# Patient Record
Sex: MALE | Race: BLACK OR AFRICAN AMERICAN | URBAN - METROPOLITAN AREA
[De-identification: names, ages, dates, MRNs, and addresses within clinical notes are randomized per-mention and may not be internally consistent; named-entity substitution may affect disease eponyms.]

---

## 2021-02-11 ENCOUNTER — HOSPITAL ENCOUNTER (OUTPATIENT)
Dept: MEDSURG UNIT | Facility: HOSPITAL | Age: 46
End: 2021-02-12
Attending: INTERNAL MEDICINE | Admitting: INTERNAL MEDICINE

## 2021-02-11 LAB
ABS NEUT (OLG): 4.24 X10(3)/MCL (ref 2.1–9.2)
ALBUMIN SERPL-MCNC: 3.8 GM/DL (ref 3.5–5)
ALBUMIN/GLOB SERPL: 1 RATIO (ref 1.1–2)
ALP SERPL-CCNC: 141 UNIT/L (ref 40–150)
ALT SERPL-CCNC: 31 UNIT/L (ref 0–55)
AMPHET UR QL SCN: NEGATIVE
APAP SERPL-MCNC: <17.4 UG/ML (ref 17.4–30)
APPEARANCE, UA: CLEAR
AST SERPL-CCNC: 31 UNIT/L (ref 5–34)
BACTERIA #/AREA URNS AUTO: ABNORMAL /HPF
BARBITURATE SCN PRESENT UR: NEGATIVE
BASOPHILS # BLD AUTO: 0 X10(3)/MCL (ref 0–0.2)
BASOPHILS NFR BLD AUTO: 0 %
BENZODIAZ UR QL SCN: NEGATIVE
BILIRUB SERPL-MCNC: 0.7 MG/DL
BILIRUB UR QL STRIP: NEGATIVE
BILIRUBIN DIRECT+TOT PNL SERPL-MCNC: 0.3 MG/DL (ref 0–0.5)
BILIRUBIN DIRECT+TOT PNL SERPL-MCNC: 0.4 MG/DL (ref 0–0.8)
BUN SERPL-MCNC: 16 MG/DL (ref 8.9–20.6)
CALCIUM SERPL-MCNC: 9.1 MG/DL (ref 8.4–10.2)
CANNABINOIDS UR QL SCN: POSITIVE
CHLORIDE SERPL-SCNC: 104 MMOL/L (ref 98–107)
CK MB SERPL-MCNC: 3.7 NG/ML
CK MB SERPL-MCNC: 4.2 NG/ML
CK SERPL-CCNC: 211 U/L (ref 30–200)
CK SERPL-CCNC: 246 U/L (ref 30–200)
CO2 SERPL-SCNC: 27 MMOL/L (ref 22–29)
COCAINE UR QL SCN: POSITIVE
COLOR UR: YELLOW
CREAT SERPL-MCNC: 1.42 MG/DL (ref 0.73–1.18)
EOSINOPHIL # BLD AUTO: 0 X10(3)/MCL (ref 0–0.9)
EOSINOPHIL NFR BLD AUTO: 0 %
ERYTHROCYTE [DISTWIDTH] IN BLOOD BY AUTOMATED COUNT: 14.1 % (ref 11.5–14.5)
ETHANOL SERPL-MCNC: <10 MG/DL
GLOBULIN SER-MCNC: 3.7 GM/DL (ref 2.4–3.5)
GLUCOSE (UA): NEGATIVE
GLUCOSE SERPL-MCNC: 65 MG/DL (ref 74–100)
HCT VFR BLD AUTO: 44.9 % (ref 40–51)
HGB BLD-MCNC: 14.5 GM/DL (ref 13.5–17.5)
HGB UR QL STRIP: NEGATIVE
HYALINE CASTS #/AREA URNS LPF: ABNORMAL /LPF
IMM GRANULOCYTES # BLD AUTO: 0.02 10*3/UL
IMM GRANULOCYTES NFR BLD AUTO: 0 %
KETONES UR QL STRIP: NEGATIVE
LEUKOCYTE ESTERASE UR QL STRIP: NEGATIVE
LYMPHOCYTES # BLD AUTO: 0.9 X10(3)/MCL (ref 0.6–4.6)
LYMPHOCYTES NFR BLD AUTO: 16 %
MCH RBC QN AUTO: 29.1 PG (ref 26–34)
MCHC RBC AUTO-ENTMCNC: 32.3 GM/DL (ref 31–37)
MCV RBC AUTO: 90 FL (ref 80–100)
MONOCYTES # BLD AUTO: 0.6 X10(3)/MCL (ref 0.1–1.3)
MONOCYTES NFR BLD AUTO: 10 %
NEUTROPHILS # BLD AUTO: 4.24 X10(3)/MCL (ref 2.1–9.2)
NEUTROPHILS NFR BLD AUTO: 73 %
NITRITE UR QL STRIP: NEGATIVE
OPIATES UR QL SCN: NEGATIVE
PCP UR QL: NEGATIVE
PH UR STRIP.AUTO: 6.5 [PH] (ref 3–11)
PH UR STRIP: 6.5 [PH] (ref 4.5–8)
PLATELET # BLD AUTO: 190 X10(3)/MCL (ref 130–400)
PMV BLD AUTO: 11.1 FL (ref 7.4–10.4)
POTASSIUM SERPL-SCNC: 4.7 MMOL/L (ref 3.5–5.1)
PROT SERPL-MCNC: 7.5 GM/DL (ref 6.4–8.3)
PROT UR QL STRIP: 50 MG/DL
RBC # BLD AUTO: 4.99 X10(6)/MCL (ref 4.5–5.9)
RBC #/AREA URNS AUTO: ABNORMAL /HPF
SALICYLATES SERPL-MCNC: <5 MG/DL
SARS-COV-2 AG RESP QL IA.RAPID: NEGATIVE
SODIUM SERPL-SCNC: 139 MMOL/L (ref 136–145)
SP GR UR STRIP: 1.01 (ref 1–1.03)
SQUAMOUS #/AREA URNS LPF: ABNORMAL /LPF
T4 FREE SERPL-MCNC: 0.93 NG/DL (ref 0.7–1.48)
TROPONIN I SERPL-MCNC: 0.13 NG/ML (ref 0–0.04)
TROPONIN I SERPL-MCNC: 0.17 NG/ML (ref 0–0.04)
TROPONIN I SERPL-MCNC: 0.21 NG/ML (ref 0–0.04)
TSH SERPL-ACNC: 0.27 UIU/ML (ref 0.35–4.94)
UROBILINOGEN UR STRIP-ACNC: NORMAL
WBC # SPEC AUTO: 5.8 X10(3)/MCL (ref 4.5–11)
WBC #/AREA URNS AUTO: ABNORMAL /HPF

## 2021-02-12 LAB
ABS NEUT (OLG): 0.94 X10(3)/MCL (ref 2.1–9.2)
ALBUMIN SERPL-MCNC: 3.2 GM/DL (ref 3.5–5)
ALBUMIN/GLOB SERPL: 1 RATIO (ref 1.1–2)
ALP SERPL-CCNC: 118 UNIT/L (ref 40–150)
ALT SERPL-CCNC: 26 UNIT/L (ref 0–55)
ANISOCYTOSIS BLD QL SMEAR: ABNORMAL
AST SERPL-CCNC: 25 UNIT/L (ref 5–34)
BASOPHILS NFR BLD MANUAL: 1 %
BILIRUB SERPL-MCNC: 0.5 MG/DL
BILIRUBIN DIRECT+TOT PNL SERPL-MCNC: 0.2 MG/DL (ref 0–0.5)
BILIRUBIN DIRECT+TOT PNL SERPL-MCNC: 0.3 MG/DL (ref 0–0.8)
BUN SERPL-MCNC: 19 MG/DL (ref 8.9–20.6)
CALCIUM SERPL-MCNC: 8.9 MG/DL (ref 8.4–10.2)
CHLORIDE SERPL-SCNC: 106 MMOL/L (ref 98–107)
CO2 SERPL-SCNC: 22 MMOL/L (ref 22–29)
CREAT SERPL-MCNC: 1.17 MG/DL (ref 0.73–1.18)
EOSINOPHIL NFR BLD MANUAL: 2 %
ERYTHROCYTE [DISTWIDTH] IN BLOOD BY AUTOMATED COUNT: 13.8 % (ref 11.5–14.5)
EST CREAT CLEARANCE SER (OHS): 79.47 ML/MIN
GLOBULIN SER-MCNC: 3.3 GM/DL (ref 2.4–3.5)
GLUCOSE SERPL-MCNC: 92 MG/DL (ref 74–100)
GRANULOCYTES NFR BLD MANUAL: 19 % (ref 43–75)
HAV IGM SERPL QL IA: NONREACTIVE
HBV CORE IGM SERPL QL IA: NONREACTIVE
HBV SURFACE AG SERPL QL IA: NONREACTIVE
HCT VFR BLD AUTO: 42.6 % (ref 40–51)
HCV AB SERPL QL IA: NONREACTIVE
HGB BLD-MCNC: 13.9 GM/DL (ref 13.5–17.5)
HIV 1+2 AB+HIV1 P24 AG SERPL QL IA: REACTIVE
LYMPHOCYTES NFR BLD MANUAL: 60 % (ref 20.5–51.1)
MCH RBC QN AUTO: 29.3 PG (ref 26–34)
MCHC RBC AUTO-ENTMCNC: 32.6 GM/DL (ref 31–37)
MCV RBC AUTO: 89.7 FL (ref 80–100)
MONOCYTES NFR BLD MANUAL: 18 % (ref 2–9)
PLATELET # BLD AUTO: 182 X10(3)/MCL (ref 130–400)
PLATELET # BLD EST: ADEQUATE 10*3/UL
PMV BLD AUTO: 11.9 FL (ref 7.4–10.4)
POTASSIUM SERPL-SCNC: 3.7 MMOL/L (ref 3.5–5.1)
PROT SERPL-MCNC: 6.5 GM/DL (ref 6.4–8.3)
RBC # BLD AUTO: 4.75 X10(6)/MCL (ref 4.5–5.9)
RBC MORPH BLD: ABNORMAL
RPR SER QL: REACTIVE
SODIUM SERPL-SCNC: 138 MMOL/L (ref 136–145)
T PALLIDUM AB SER QL: REACTIVE
TROPONIN I SERPL-MCNC: 0.19 NG/ML (ref 0–0.04)
WBC # SPEC AUTO: 3.2 X10(3)/MCL (ref 4.5–11)

## 2021-02-14 LAB — FINAL CULTURE: NO GROWTH

## 2021-02-17 LAB
FINAL CULTURE: NORMAL
FINAL CULTURE: NORMAL

## 2022-04-30 NOTE — ED PROVIDER NOTES
"   Patient:   Ignacio Manzanares             MRN: [id number]            FIN: [id number]               Age:   45 years     Sex:  Male     :  1975   Associated Diagnoses:   Suicidal ideations; History of seizures; Atypical chest pain; Elevated troponin   Author:   Damian Zarate MD      Basic Information   Time seen: Date & time 2021 13:07:00.   History source: Patient.   Arrival mode: Ambulance.   History limitation: None.   Additional information: Chief Complaint from Nursing Triage Note : Chief Complaint   2021 12:59 CST      Chief Complaint           PT WEARING MASK IN /AASI W CO CP AND BREAKTHRU SEIZURES X 2 DAYS. REPORT OF VERBAL ALTERCATION PTA AND NOW  STATES HE IS HOMELESS.  INTERMITTENT SI. -HI. REPORTS COMPLIANCE W SOME  MEDS. + DRUG USE. EKG OBTAINED.  .   Provider/Visit info:   Time Seen:  Damian Zarate MD / 2021 13:07  .   History of Present Illness   The patient presents with suicidal ideation.  The onset was just prior to arrival.  The course/duration of symptoms is constant.  Character of symptoms depressed.  The degree of symptoms is moderate.  Self injury: none.  There are exacerbating factors including family problems, financial problems and housing problems.  The relieving factor is none.  Risk factors consist of suicide risk, drug abuse and homeless.  Prior episodes: none.  Therapy today: none.  Associated symptoms: denies fever, denies chills, denies nausea, denies vomiting, denies headache, denies dizziness, denies chest pain, denies shortness of breath, denies abdominal pain and denies altered vision.  Additional history:     45-year-old black male presents to the ED with complaint of suicidal ideation, seizures, chest pain.  Patient reports that he is originally from [place] and came to [place] " but it has been nothing but trouble for me".  Patient reports " I had a verbal altercation and got kicked out and now I am homeless". Pt reports " after the altercation, I got anxiety " "and had a breakthrough seizure with my whole body shaking" " and now I feel suicidal with paln to overdose.   .        Review of Systems   Constitutional symptoms:  No fever, no chills, no sweats, no weakness, no fatigue, no decreased activity.    Skin symptoms:  No rash, no pruritus, no abrasions, no breakdown, no dryness, no lesion.    Respiratory symptoms:  No shortness of breath, no cough, no hemoptysis, no sputum production.    Cardiovascular symptoms:  No chest pain, no palpitations, no tachycardia, no syncope, no diaphoresis, no peripheral edema.    Gastrointestinal symptoms:  No abdominal pain, no nausea, no vomiting, no diarrhea, no constipation, no rectal bleeding.    Genitourinary symptoms:  No dysuria, no hematuria.    Musculoskeletal symptoms:  No back pain, no Muscle pain, no Joint pain.    Neurologic symptoms:  No headache, no dizziness, no altered level of consciousness, no numbness, no tingling, no weakness.    Psychiatric symptoms:  Depression, Suicidal ideation .              Additional review of systems information: All other systems reviewed and otherwise negative.      Health Status   Allergies:    No active allergies have been recorded.,    No qualifying data available  .   Medications:  (Selected)   .      Past Medical/ Family/ Social History   Medical history:    No active or resolved past medical history items have been selected or recorded., Reviewed as documented in chart.   Surgical history:    No active procedure history items have been selected or recorded., Reviewed as documented in chart.   Family history:    No family history items have been selected or recorded., Reviewed as documented in chart.   Social history:    Social & Psychosocial Habits    Substance Use  02/11/2021  Use: Current    Type: Cocaine    Frequency: 3-5 times per week    Tobacco  02/11/2021  Use: Former smoker, quit more    Patient Wants Consult For Cessation Counseling N/A    Abuse/Neglect  02/11/2021  SHX Any " signs of abuse or neglect Yes    Describe  Abuse or neglect present HOMELESS    Feels unsafe at home: No  , Reviewed as documented in chart.   Problem list:    No qualifying data available  .      Physical Examination               Vital Signs   Vital Signs   2/11/2021 12:59 CST      Temperature Oral          36.6 DegC                             Temperature Oral (calculated)             97.88 DegF                             Peripheral Pulse Rate     85 bpm                             SpO2                      100 %                             Oxygen Therapy            Room air                             Systolic Blood Pressure   151 mmHg  HI                             Diastolic Blood Pressure  107 mmHg  HI  .      Vital Signs (last 24 hrs)_____  Last Charted___________  Temp Oral     36.6 DegC  (FEB 11 12:59)  Heart Rate Peripheral   85 bpm  (FEB 11 12:59)  SBP      H 151mmHg  (FEB 11 12:59)  DBP      H 107mmHg  (FEB 11 12:59)  SpO2      100 %  (FEB 11 12:59)  Weight      69 kg  (FEB 11 12:59)  Height      175 cm  (FEB 11 12:59)  BMI      22.53  (FEB 11 12:59)  .   General:  Alert, no acute distress.    Skin:  Warm, pink, intact.    Head:  Normocephalic.   Eye:  Normal conjunctiva.   Ears, nose, mouth and throat:  Oral mucosa moist.   Cardiovascular:  Regular rate and rhythm, Normal peripheral perfusion.    Respiratory:  Lungs are clear to auscultation, respirations are non-labored, breath sounds are equal.    Chest wall:  No tenderness.   Gastrointestinal:  Soft, Nontender, Non distended, Normal bowel sounds.    Neurological:  Alert and oriented to person, place, time, and situation, No focal neurological deficit observed.    Psychiatric:  Mood and affect: Depressed, Behavior: Relaxed, Judgment: Impaired by abnormal thoughts, Abnormal / Psychotic thoughts: Suicidal.       Medical Decision Making   Documents reviewed:  Emergency department nurses' notes, emergency department records, prior records.     Electrocardiogram:  Time 2/11/2021 12:59:00, rate 88, normal sinus rhythm, no ectopy, normal HI & QRS intervals, EP Interp, T wave Inversion, II, , III, , AVF, Previous EKG available None available.    Electrocardiogram:  Time 2/11/2021 15:08:00, rate 76, normal sinus rhythm, no ectopy, normal HI & QRS intervals, EP Interp, T wave II, III, , AVF, Previous EKG available No changes, compared with 2/11/2021 12:59:00.    Results review:  Lab results : Lab View   2/11/2021 17:03 CST      Total CK                  246 U/L  HI                             CK MB                     4.2 ng/mL                             Troponin-I                0.167 ng/mL  HI    2/11/2021 14:39 CST      Est Creat Clearance Ser   65.47 mL/min    2/11/2021 14:05 CST      Sodium Lvl                139 mmol/L                             Potassium Lvl             4.7 mmol/L                             Chloride                  104 mmol/L                             CO2                       27 mmol/L                             Calcium Lvl               9.1 mg/dL                             Glucose Lvl               65 mg/dL  LOW                             BUN                       16.0 mg/dL                             Creatinine                1.42 mg/dL  HI                             eGFR-AA                   69  LOW                             eGFR-NEHEMIAH                  57 mL/min/1.73 m2  LOW                             Bili Total                0.7 mg/dL                             Bili Direct               0.3 mg/dL                             Bili Indirect             0.40 mg/dL                             AST                       31 unit/L                             ALT                       31 unit/L                             Alk Phos                  141 unit/L                             Total Protein             7.5 gm/dL                             Albumin Lvl               3.8 gm/dL                             Globulin                   3.7 gm/dL  HI                             A/G Ratio                 1.0 ratio  LOW                             Total CK                  211 U/L  HI                             CK MB                     3.7 ng/mL                             Troponin-I                0.130 ng/mL  HI                             T4 Free                   0.93 ng/dL                             TSH                       0.2690 uIU/mL  LOW                             WBC                       5.8 x10(3)/mcL                             RBC                       4.99 x10(6)/mcL                             Hgb                       14.5 gm/dL                             Hct                       44.9 %                             Platelet                  190 x10(3)/mcL                             MCV                       90.0 fL                             MCH                       29.1 pg                             MCHC                      32.3 gm/dL                             RDW                       14.1 %                             MPV                       11.1 fL  HI                             Abs Neut                  4.24 x10(3)/mcL                             Neutro Auto               73 %  NA                             Lymph Auto                16 %  NA                             Mono Auto                 10 %  NA                             Eos Auto                  0 %  NA                             Abs Eos                   0.0 x10(3)/mcL                             Basophil Auto             0 %  NA                             Abs Neutro                4.24 x10(3)/mcL                             Abs Lymph                 0.9 x10(3)/mcL                             Abs Mono                  0.6 x10(3)/mcL                             Abs Baso                  0.0 x10(3)/mcL                             IG%                       0 %  NA                             IG#                       0.020  NA                              Acetaminoph Lvl           <17.4 ug/ml  LOW                             Salicylate Lvl            <5.0 mg/dL  NA                             Ethanol Lvl               <10.0 mg/dL  NA    2/11/2021 13:39 CST      COVID-19 Rapid            NEGATIVE    2/11/2021 13:30 CST      U pH                      6.5                             UA Appear                 Clear                             UA Color                  YELLOW                             UA Spec Grav              1.013                             UA Bili                   Negative                             UA pH                     6.5                             UA Urobilinogen           Normal                             UA Blood                  Negative                             UA Glucose                Negative                             UA Ketones                Negative                             UA Protein                50 mg/dL                             UA Nitrite                Negative                             UA Leuk Est               Negative                             UA WBC Interp             11-25 /HPF                             UA RBC Interp             0-2 /HPF                             UA Bact Interp            None Seen /HPF                             UA Squam Epi Interp        /LPF                             UA Sperm                  Moderate                             UA Hyal Cast Interp       6-10 /LPF                             U Amph Scr                Negative                             U Elizabeth Scr                Negative                             U Benzodia Scr            Negative                             U Cannab Scr              Positive                             U Cocaine Scr             Positive                             U Opiate Scr              Negative                             U Phencyclidine Scr       Negative  , Interpretation Abnormal results  Elevated  troponinpatient with no active chest pain at this time.    Radiology results:  Reviewed radiologist's report, Rad Results (ST)  < 12 hrs   Accession: [id number]  Order: XR Chest 2 Views  Report Dt/Tm: 02/11/2021 14:48  Report:   CHEST X-RAY, PA AND LATERAL VIEWS     HISTORY: Chest Pain     COMPARISON: None.     FINDINGS:  Dual-lead left chest cardiac device leads project over the circulation  the right atrium and ventricle.     Bilateral mid to lower lung zone nipple shadows.  Faint patchy right lower lung zone airspace opacity.  Blunting of the left lateral and posterior costophrenic sulci.  Otherwise no focal consolidations, pleural effusions or  pneumothoraces.  Cardiac silhouette top normal in size without overt decompensation.  Tortuous thoracic aorta.  No acute bony pathology.  Soft tissues within normal limits.     IMPRESSION:     Patchy right lower lung zone airspace opacity may relate to  atelectasis or developing infection.  Trace right effusion versus pleural thickening.    .       Reexamination/ Reevaluation   Time: 2/11/2021 15:00:00 .   Course: progressing as expected.   Assessment:   Reviewed labs.  Reexamined patient.  Patient denies active chest pain or shortness of breath at this time.  Patient with elevated troponin most likely secondary to polysubstance abuse.  Repeat EKG ordered.  2nd set of Cardiac panel ordered.  Aspirin given to patient.  Will continue to monitor patient..   Time: 2/11/2021 17:30:00 .   Vital signs   results included from flowsheet : Vital Signs   2/11/2021 17:00 CST      Peripheral Pulse Rate     81 bpm                             Respiratory Rate          17 br/min                             SpO2                      100 %                             Oxygen Therapy            Room air                             Systolic Blood Pressure   155 mmHg  HI                             Diastolic Blood Pressure  96 mmHg  HI    2/11/2021 12:59 CST      Temperature Oral           36.6 DegC                             Temperature Oral (calculated)             97.88 DegF                             Peripheral Pulse Rate     85 bpm                             SpO2                      100 %                             Oxygen Therapy            Room air                             Systolic Blood Pressure   151 mmHg  HI                             Diastolic Blood Pressure  107 mmHg  HI     Course: progressing as expected.   Assessment: 2nd set of cardiac markers reviewed.  Patient with slight increase in troponin.  Patient with no active chest pain or shortness of breath at this time.  At this time patient is not medically cleared and will consult medicine for admission.  Please see their consultation note..      Procedure   Critical care note   Total time: 30 minutes spent engaged in work directly related to patient care and/ or available for direct patient care.   Critical condition(s) addressed for impending deterioration include: cardiovascular, metabolic, renal, hepatic.   Associated risk factors: dysrhythmia, metabolic changes, dehydration, hypertension, drug or med overdose.   Management: bedside assessment, supervision of care, Interpretation (chest x-ray, electrocardiogram, blood pressure, cardiac output measures).   Performed by: Elliot crenshaw MD, Asma.      Impression and Plan   Diagnosis   Suicidal ideations (ZWX29-AP R45.851)   History of seizures (OSA07-JM Z87.898)   Atypical chest pain (UED60-SH R07.89)   Elevated troponin (ZEJ45-BW R77.8)      Calls-Consults   -  2/11/2021 17:42:00 , Internal medicine, recommends Admission.    Plan   Condition: Stable.    Disposition: Place in Observation Telemetry Unit.    Counseled: Patient, Regarding diagnosis, Regarding diagnostic results, Regarding treatment plan, Patient indicated understanding of instructions.

## 2022-05-03 NOTE — HISTORICAL [facility] CERNER
This is a historical note converted from Marah. Formatting and pictures may have been removed.  Please reference Marah for original formatting and attached multimedia. Chief Complaint  PT WEARING MASK IN /AASI W CO CP AND BREAKTHRU SEIZURES X 2 DAYS. REPORT OF VERBAL ALTERCATION PTA AND NOW ?STATES HE IS HOMELESS. ?INTERMITTENT SI. -HI. REPORTS COMPLIANCE W SOME ?MEDS. + DRUG USE. EKG OBTAINED.  History of Present Illness  Mr. Manzanares is a 45M presenting to [facility] ED for active SI. ?Patient reports?that he has been feeling increased stress in his life?and has been attempting to take cocaine to kill himself.? Patient reports last usage yesterday.? Patient reports that this morning at approximately 10 AM he had an episode of chest pain that lasted 10 minutes. ?Described the pain as?sharp on the left side of his chest. ?Denies radiation to his jaw or bilateral arms.? Denies history of chest pain with exertion,?reports chest pain with?increased stress.? Patient reports history of cardiac arrhythmia, treated with an ICD?placed 2 years ago in [facility].  Patient also reports a seizure this morning, unwitnessed. ?Patient states that he has?convulsive seizures. ?Unsure of the amount of time that he had the seizure, does not remember seizures.? States that he woke up confused. ?Patient does have a history?of seizures that become more frequent?with increased drug use.? Denies any medication?for seizures.? Denies seizures with cessation of alcohol.  ?  PMH:?Explosive?disorder, depression, anxiety,?bipolar, hypertension, grand mal?seizures  PSurgHx: ICD placement 2 years ago in [place], [place]  Medication: Celexa, Vistaril (patient unsure of medication dosages, does not have medications on him currently)  Allergies:?No known drug allergies  Social:?Homeless, from [place], [place].? Positive cocaine, marijuana. ?Denies other drugs.? Positive tobacco, 1 pack/day for 10 years.? Positive?alcohol, vodka,?up to a bottle a day,  last drink?2/9.  Review of Systems  Constitutional:?No fever, chills  HENT:?Denies headaches  Eyes:?Denies vision changes  Cardiovascular:?+chest pain  Respiratory:?Denies cough, shortness of breath, pain with breathing  Abdominal:?Denies nausea, vomiting, diarrhea, constipation, abdominal pain  :?Denies?dysuria,?urinary frequency,?urinary hesitancy, foul-smelling urine  MSK:?Denies weakness, pain  Skin:?Denies rashes, trauma  Neuro:?+seizure  Physical Exam  Vitals & Measurements  T:?36.6? ?C (Oral)? HR:?81(Peripheral)? RR:?17? BP:?155/96? SpO2:?100%? WT:?69?kg? BMI:?22.53?  Constitutional:?Adult Male?in no acute distress  HENT:?Atraumatic, normocephalic  Eyes:?Extraocular movements intact, no scleral icterus, pupils equal and reactive.  Cardiovascular:?Regular rate and rhythm, no murmurs rubs or gallops,?radial pulses 2+,?no lower extremity edema, cap refill brisk  Respiratory:?Clear to auscultation bilaterally, no?wheezes?no coarse breath sounds, chest rise symmetric, satting well on room air  Abdominal:?Nondistended,?nontender to palpation of all quadrants, no masses?appreciated  MSK:?Symmetric?movement of all extremities with no weakness appreciated  Skin:?No rashes, lesions, or trauma  Neuro:?Awake and alert.? Cranial nerves II through XII intact.?No decrease in strength or sensation.  Assessment/Plan  1.?Atypical chest pain?R07.89  -Endorses cocaine use, last use 2/10, reports excessive use  -h/o arrhythmia, ICD in place per patient  -Full dose aspirin?given?in ED  -Troponin?0.13 on admit, 0.167 on repeat  -EKG with inferior?lead T wave?inversion  -Trend troponin?until?peak?with?EKGs  -As needed nitroglycerin?for chest pain, call?MD?if chest pain  -Likely?cocaine induced chest pain  2.?YASMINE (acute kidney injury)?N17.9  -Creatinine?1.42?on admit  -No baseline, patient from?out of town  -Maintenance IV fluids of 150 mL/h of LR  -CMP in morning  3.?Seizure?[id number]  -Patient reported  seizure?2/11 AM  -Reports?grand mal seizure, unwitnessed  -Reports increased seizures with increased medication use?with suicidal?attempts?via OD  -Likely 2/2 to drug use  -Never prescribed seizure medications  -If another seizure, consider EEG  4.?Suicidal ideation?[id number]  -Patient reports active suicidal attempts?via OD  -Patient PEC?by?ED  -When?medically stable, discharged to psych facility  PPX: SCDs  Dispo:?Admit to telemetry floor for?ACS rule out. ?Will trend troponins?with EKG until peak.? Eventual?discharge to psychiatric facility, currently under PEC.   Medications  Inpatient  IVF Lactated Ringers LR Infusion 1,000 mL, 1000 mL, IV  nitroglycerin 0.4 mg sublingual TAB, 0.4 mg= 1 tab(s), SL, q5min, PRN  Home  No active home medications  Allergies  No active allergies  Social History  Abuse/Neglect  Yes, HOMELESS, No, 02/11/2021  Substance Use  Current, Cocaine, 3-5 times per week, 02/11/2021  Tobacco  Former smoker, quit more than 30 days ago, N/A, 02/11/2021  Lab Results  Labs Last 24 Hours?  ?Chemistry? Hematology/Coagulation?   Sodium Lvl: 139 mmol/L (02/11/21 14:05:00) WBC: 5.8 x10(3)/mcL (02/11/21 14:05:00)   Potassium Lvl: 4.7 mmol/L (02/11/21 14:05:00) RBC: 4.99 x10(6)/mcL (02/11/21 14:05:00)   Chloride: 104 mmol/L (02/11/21 14:05:00) Hgb: 14.5 gm/dL (02/11/21 14:05:00)   CO2: 27 mmol/L (02/11/21 14:05:00) Hct: 44.9 % (02/11/21 14:05:00)   Calcium Lvl: 9.1 mg/dL (02/11/21 14:05:00) Platelet: 190 x10(3)/mcL (02/11/21 14:05:00)   Glucose Lvl:?65 mg/dL?Low (02/11/21 14:05:00) MCV: 90 fL (02/11/21 14:05:00)   BUN: 16 mg/dL (02/11/21 14:05:00) MCH: 29.1 pg (02/11/21 14:05:00)   Creatinine:?1.42 mg/dL?High (02/11/21 14:05:00) MCHC: 32.3 gm/dL (02/11/21 14:05:00)   Est Creat Clearance Ser: 65.47 mL/min (02/11/21 14:39:18) RDW: 14.1 % (02/11/21 14:05:00)   eGFR-AA:?69?Low (02/11/21 14:05:00) MPV:?11.1 fL?High (02/11/21 14:05:00)   eGFR-NEHEMIAH:?57 mL/min/1.73 m2?Low (02/11/21  14:05:00) Abs Neut: 4.24 x10(3)/mcL (02/11/21 14:05:00)   Bili Total: 0.7 mg/dL (02/11/21 14:05:00) Neutro Auto: 73 % (02/11/21 14:05:00)   Bili Direct: 0.3 mg/dL (02/11/21 14:05:00) Lymph Auto: 16 % (02/11/21 14:05:00)   Bili Indirect: 0.4 mg/dL (02/11/21 14:05:00) Mono Auto: 10 % (02/11/21 14:05:00)   AST: 31 unit/L (02/11/21 14:05:00) Eos Auto: 0 % (02/11/21 14:05:00)   ALT: 31 unit/L (02/11/21 14:05:00) Abs Eos: 0 x10(3)/mcL (02/11/21 14:05:00)   Alk Phos: 141 unit/L (02/11/21 14:05:00) Basophil Auto: 0 % (02/11/21 14:05:00)   Total Protein: 7.5 gm/dL (02/11/21 14:05:00) Abs Neutro: 4.24 x10(3)/mcL (02/11/21 14:05:00)   Albumin Lvl: 3.8 gm/dL (02/11/21 14:05:00) Abs Lymph: 0.9 x10(3)/mcL (02/11/21 14:05:00)   Globulin:?3.7 gm/dL?High (02/11/21 14:05:00) Abs Mono: 0.6 x10(3)/mcL (02/11/21 14:05:00)   A/G Ratio:?1 ratio?Low (02/11/21 14:05:00) Abs Baso: 0 x10(3)/mcL (02/11/21 14:05:00)   Total CK:?246 U/L?High (02/11/21 17:03:00) IG%: 0 % (02/11/21 14:05:00)   CK MB: 4.2 ng/mL (02/11/21 17:03:00) IG#: 0.02 (02/11/21 14:05:00)   Troponin-I:?0.167 ng/mL?High (02/11/21 17:03:00)    T4 Free: 0.93 ng/dL (02/11/21 14:05:00)    TSH:?0.269 uIU/mL?Low (02/11/21 14:05:00)    U pH: 6.5 (02/11/21 13:30:00)    Diagnostic Results  (02/11/2021 14:43 CST XR Chest 2 Views)  IMPRESSION:  ?  Patchy right lower lung zone airspace opacity may relate to  atelectasis or developing infection.  Trace right effusion versus pleural thickening. [1]     [1]?XR Chest 2 Views; Elliot CRESPO, Saqib Lynn 02/11/2021 14:43 CST   Pt states that he has been prescribed?intermittent Keppra 500 BID for seizures. Will add while inpatient.  ?  Fanta Zamora M.D.  [facility], HO-1   HOII Addendum  ?  I have seen and evaluated the patient and agree with Dr. Aranda assessment and plan as documented above. This patient has a history of depression and reports that he has had an increase in depressive symptoms for the past few days, apparently  precipitated by being evicted from his home. Tried to harm himself by using cocaine, had plans to OD. Reported to ED after experiencing an unwitnessed seizure, unsure of details as above. Patient states that he does not take his keppra regularly. Patient reports that he had chest pain, worse with respiration, non radiating, no associated dyspnea, nausea, or diaphoresis, prior to presentation but has had no further episodes since. ED physician has placed him under a PEC to be transferred to a psychiatric facility for further evaluation and treatment. We will admit him to rule out ACS. His NSTEMI is believed at this time to have been caused by his cocaine use. Cardiac enzymes will be trended along with EKGs and we will administer IVF for his YASMINE. PRN labetalol for hypertension, avoid selective?beta blockers.  ?  ????Ela Rocha MD HO-II  ????Family Medicine Resident ?   I was present with the resident during the history and physical examination.  ???  [ x] I discussed the case with the resident and agree with the findings and plan as documented in the residents note.  [ ] I discussed the case with the resident and agree with the findings and plan as documented in the residents note except:  PEC for suicidal ideations, currently says he wants to hurt himself  Says he took?cocaine and marijuana to help him cope  Psych evaluation recommending placement/medications  Medically, patient has history of MI about one year ago, has an ICD with atrial pacing noted  Did complain of CP but is located at site of his ICD and that the pain has been worsening sine it was placed  No erythema, tenderness to palpation  Troponins were mildly elevated and now downtrending,?EKG with atrial paced rhythm, will discuss with cardiology pr interval and review EKG- tx as NSTEMI type 2 due to HTN/cocaine  YASMINE- fluids, improving, likely poor intake, no N/V/diarrhea  EEG performed as patient states he had a seizure yesterday and has had them in  the past- [place] with no current seizure activity, will not start seizure medications but continue medications if he is already on a seizure medication  ?   HIV positive- continue home medications

## 2022-05-03 NOTE — HISTORICAL [facility] CERNER
This is a historical note converted from Marah. Formatting and pictures may have been removed.  Please reference Marah for original formatting and attached multimedia. Admit and Discharge Dates  Admit Date: 02/11/2021  Discharge Date: 02/12/2021  Physicians  Attending Physician - Marcelo CRESPO, Gloria BLACK  Admitting Physician - Marcelo CRESPO, Gloria BLACK  Consulting Physician - Zahra CRESPO, Wicho BLACK  Consulting Physician - Adeel CRESPO, Deanna EDGAR  Primary Care Physician - No PCP, No  Discharge Diagnosis  Suicidal ideation?  Severe bipolar I disorder, most recent episode depressed without psychotic features  Depression  Anxiety  HTN  CAD  Arrhythmia, has ICD?  HLD  HIV  Tobacco Abuse  Hx of Treated Syphilis, per patient  Self-reported hx of Seizure, unwitnessed  Surgical Procedures  No procedures recorded for this visit.  Immunizations  No immunizations recorded for this visit.  Admission Information  Mr. Manzanares is a 45 year old AA?Male w/?PMH of Explosive?disorder, depression, anxiety,?bipolar, hypertension, grand mal?seizures, tobacco abuse, hx of MI?some years ago,?pacemaker/ICD placed 2 years ago for unknown arrhythmia?presenting to [facility] ED via EMS?for active SI. ?Patient reports?that he has been feeling increased stress in his life?and has been attempting to take cocaine to kill himself;?last used yesterday.? Patient reports that this morning at approximately 10 AM he had an episode of chest pain that lasted 10 minutes. ?Described pain as?sharp on the left side of his chest, denies radiation or pain.?Patient reports history of cardiac arrhythmia, treated with an ICD?placed 2 years ago in [facility]. Patient reported?seizure this morning, unwitnessed, and states that he has hx of convulsive seizures.?Unsure of the amount of time that he had the seizure, does not remember seizure, and states he woke up confused.?Patient does have a history?of seizures that become more frequent?with increased drug use.? Denies being any  medication?for seizures.? Denies seizures with cessation of alcohol.?UDS Positive cocaine, marijuana.?Positive tobacco?1 ppd/day for 10 years.? Positive?alcohol, vodka,?up to a bottle a day, last drink?2/9. COVID negative. States he is from [place], [place] and came to?[place], [place] to date someone he met online who ended up being a catfish and that he is currently homeless.  Hospital Course  Patient was PECd on admission secondary to suicide attempt with cocaine use. Patient was found to have UDS + cocaine and marijuana. Medical records were unable to be obtained as patient is from out of town and states he has no primary care physician that he follows up with regularly. Patient presented with YASMINE on admission (sCr 1.47) that quickly resolved with appropriate IVF resuscitation (sCr 1.17). Troponin and EKG were obtained 2/2 patient c/o of atypical chest pain and were found to be mildly elevated with no acute signs of ischemia on EKG. Troponins were then trended q6hr and appropriately downtrended. Troponin elevation was?determined to be most likely due to demand ischemia from cocaine use, as no acute changes on EKG were noted and Troponins did not increase.?Patients case, including cardiac history, patients troponin and EKGs were discussed with [facility] Cardiologist who stated that no further intervention or ECHO was needed and that?troponin appropriately decreased with no need to continue trending. Patients Medtronic ICD was interrogated and found to have no shocks delivered and normal ICD function. EEG was conducted after patient reported having a seizure sometime around 2 am that was unwitnessed. ?EEG was found to be normal with no ongoing seizure activity or any evidence of status epilepticus, per Neurology read. Patient stated that?he is still depressed and still wants to die. Psychiatry was consulted and patient was evaluated and recommended inpatient psych placement for active suicidal? ideation and acute attempt. Psych  recommended Depakote 250mg PO BID for mood, VPA in three days, and?Zoloft for depression. Of note, patient was found to be Syphilis Ab + and HIV?positive. Patient states that he was?previously treated for Syphilis with Bicillin?some years ago?and usually takes Triumeq for his HIV but has not taken them in the past few days-week. Patient was encouraged to start taking his GRAHAM therapy and importance of medical compliance. Patient was determined to be medically stable for discharge to inpatient psych rehabilitation.  Time Spent on discharge  >35 minutes  Objective  Vitals & Measurements  T:?36.9? ?C (Oral)? TMIN:?36.5? ?C (Oral)? TMAX:?36.9? ?C (Oral)? HR:?69(Peripheral)? RR:?18? BP:?156/112? SpO2:?99%? WT:?69?kg? BMI:?22.53?  Physical Exam  General:?well-developed, thin male with poor hygiene?in no acute distress, poor dentition  Eye: PERRLA, EOMI, alona conjunctiva, eyelids normal  Neck: full range of motion, no thyromegaly  Respiratory:?clear to auscultation bilaterally  Cardiovascular:?regular rate and rhythm without murmurs, gallops or rubs  Gastrointestinal:?soft, non-tender, non-distended with normal bowel sounds, without masses to palpation  Musculoskeletal:?full range of motion of all extremities/spine without limitation or discomfort  Integumentary: ICD in Upper left arm near clavicle  Neurologic: cranial nerves intact, no signs of peripheral neurological deficit  Patient Discharge Condition  Patient was discharged in clinical and hemodynamically stable condition.  Discharge Disposition  Patient told to report to ED if symptoms worsen.  Follow-up with PCP in [place] after inpatient rehab completion.   Discharge Medication Reconciliation  Prescribed  divalproex sodium (Depakote  mg oral tablet, extended release)?250 mg, Oral, BID  levETIRAcetam (Keppra 500 mg oral tablet)?1,000 mg, Oral, BID  lisinopril (lisinopril 10 mg oral tablet)?See Instructions  sertraline (Zoloft 50 mg oral tablet)?50 mg, Oral,  Daily  Continue  abacavir/dolutegravir/lamivudine (Triumeq oral tablet)?See Instructions  aspirin (aspirin 81 mg oral tablet, CHEWABLE)?81 mg, Chewed, Daily  atorvastatin (atorvastatin 40 mg oral tablet)?40 mg, Oral, Daily  clopidogrel (Plavix 75 mg oral tablet)?75 mg, Oral, Daily  hydrOXYzine (hydrOXYzine pamoate 50 mg oral capsule)?50 mg, Oral, Daily  Discontinue  divalproex sodium (Depakote  mg oral tablet, extended release)?250 mg, Oral, BID  levETIRAcetam (Keppra 500 mg oral tablet)?500 mg, Oral, BID  lisinopril (lisinopril 10 mg oral tablet)?10 mg, Oral, Daily  Education and Orders Provided  Suicidal Feelings: How to Help Yourself  Major Depressive Disorder, Adult, Easy-to-Read  CIS Pacemaker/ICD instructions after implant (Custom)  Substance Use Disorder and Mental Illness  Discharge - 02/12/21 14:50:00 CST, Discharge to Psychiatric Facility?  Follow up  Report to Emergency Department if symptoms return or worsen  Follow-up with PCP in [place] after inpatient rehab completion.  [facility], within 2 days, on  Car Seat Challenge  No Qualifying Data      I was present with the Resident during discharge day management.  ???  [ ]x I discussed the case with the Resident and agree with the discharge plan as above.  [ ] I discussed the case with the Resident and agree with the discharge plan as above except:  ???  Time spent on discharge [ 35] minutes

## 2022-05-03 NOTE — HISTORICAL [facility] CERNER
This is a historical note converted from [employer]. Formatting and pictures may have been removed.  Please reference [employer] for original formatting and attached multimedia. Chief Complaint  PT WEARING MASK IN /AASI W CO CP AND BREAKTHRU SEIZURES X 2 DAYS. REPORT OF VERBAL ALTERCATION PTA AND NOW ?STATES HE IS HOMELESS. ?INTERMITTENT SI. -HI. REPORTS COMPLIANCE W SOME ?MEDS. + DRUG USE. EKG OBTAINED.  History of Present Illness  Ignacio is a 45-year old male presenting to ER with c/o chest pain, breakthrough seizures x 2 days. He reports he is homeless, has intermittent SI and compliance with some meds. He admits to drug use.he is originally from [place] and came to [place]  but it has been nothing but trouble for me. ?Patient reports  I had a verbal altercation and got kicked out and now I am homeless. Pt reports  after the altercation, I got anxiety and had a breakthrough seizure with my whole body shaking  and now I feel suicidal with pain to overdose. He was admitted after he had a slight increase in his troponin in the ER.  He reported that he had a plan to OD on cocaine.Patient reports history of cardiac arrhythmia, treated with an ICD?placed 2 years ago in [facility].  Ignacio reports he does not know why he is so depressed. He has been struggling with depression intermittently for a while. He admits to over a week of suicidal thoughts with a plan to OD on cocaine. He denies HI/VH. but admits to AH my grandmother, you know. He admits to anhedonia, avolition, and racing thoughts at times. Poor sleep pattern occasionally as well as low appetite. He admits to intermittent paranoia, a lot of anxiety. but does not seem delusional. He was on celexa and did not like it. He cannot recall and other medications he has been on in the past. He does want to go to an inpatient unit for help with his bipolar disorder and is worried about his life and his worsening depression.  UDS:?_?Cocaine and THC  ETOH: <10  Psychiatric  history:Explosive?disorder, depression, anxiety,?bipolar; last inpatient was in [place] about a year ago. No outpatient.  Substance use history:Cocaine-since I was little, THC- same, everyday since young., 1 PPD x 10 yrs; uses vodka, up to a bottle a day- He actually denies this with me and reports he only drinks beer a couple of times a week.. Last drink 2/9/2021; drinks a couple times a week. Denies daily ETOH. Denies withdrawals.  Family history:Maternal uncles mental stuff.  Medical/Surgical history: Explosive?disorder, depression, anxiety,?bipolar, hypertension, grand mal?seizures-ICD x 2 yrs ago  Social history:  ????work:_?[employer]  ????education level:?_?10th grade  ????living situation:?_?Homeless, from [facility]; came here to meet someone. Also staying in [place] at one time.  ????marital status:Single?  ????children: None  ????legal issues:?_?Denies  ????support system:?No support?  ????medication history:?No Medications per patient  Mental Status Exam  Mood _??Depressed?Sad?Anxious?Worrisome_?_?_____  Thought Process _Circumstantial, but also goal directed at times.?_?_?__?_  Delusions _Paranoid-at times, not currently. Less delusional. More paranoid in general?___  Speech _Normal__  Attitude _?Cooperative__  Affect _Sad__  Appearance _disheveled_?  Hallucinations _?Auditory?__  Motor Activity _?STARR?  Attention / Concentration _Intact  Judgement _Impaired  Orientation _?Time:?Y?? Place:?Y?? Person:?Y??? Situations:?Y  Memory _?Immediate:?3/3?? Recent:?3/3?? Remote:?3/3  Abstract Thinking _?Age appropriate  Gross Est. of Intelligence _?Average  Assets _?Motivated for tx?______  Insight _?Impaired  Homicidality _Not present  Suicidality _?Plan/Intent, intermittent. Still has plan to OD.  ?  ?  ?  ?  Assessment and Treatment Plan  1.?Atypical chest pain?R07.89  2.?YASMINE (acute kidney injury)?N17.9  3.?Seizure?[id number]  4.?Suicidal ideation?[id number]  5.?Severe  bipolar I disorder, most recent episode depressed without psychotic features?F31.4  ?Will add DEpakote 250mg PO BID for mood,  VPA in three days, reconsult psych if titration is needed.  Will also add Zoloft for depression.  Monitor for worsening SI.  Recommendation for inpatient psych once medically cleared.  ?  Educated patient on possible medications side effects as well as risk and benefits of medications. Pt verbalized understanding of all instructions.   Allergies  No Known Medication Allergies  Medications  Inpatient  hydrALAZINE (Apresoline) Inj., 10 mg= 0.5 mL, IV Push, q2hr, PRN  IVF Lactated Ringers LR Infusion 1,000 mL, 1000 mL, IV  Keppra 500 mg oral tablet, 500 mg= 1 tab(s), Oral, BID  lisinopril 10 mg oral tablet, 10 mg= 1 tab(s), Oral, Daily  nitroglycerin 0.4 mg sublingual TAB, 0.4 mg= 1 tab(s), SL, q5min, PRN  Home  No active home medications  Social History  Abuse/Neglect  Yes, HOMELESS, No, No, 02/12/2021  Substance Use  Current, Cocaine, 3-5 times per week, 02/11/2021  Tobacco  Former smoker, quit more than 30 days ago, N/A, 02/12/2021  Examination  Vitals & Measurements  Vitals  Weight Measured: 69 kg  Height/Length Measured: 175 cm  Body Mass Index Measured: 22.53 kg/m2  Temperature Oral: 36.8 DegC  Peripheral Pulse Rate: 63 bpm  SpO2: 98 %  Systolic Blood Pressure:?152 mmHg?High  Diastolic Blood Pressure:?98 mmHg?High  Lab Results  Test Name Test Result Date/Time Comments   Sodium Lvl 138 mmol/L 02/12/2021 04:30 CST    Sodium Lvl 139 mmol/L 02/11/2021 14:05 CST    Potassium Lvl 3.7 mmol/L 02/12/2021 04:30 CST    Potassium Lvl 4.7 mmol/L 02/11/2021 14:05 CST    Chloride 106 mmol/L 02/12/2021 04:30 CST    Chloride 104 mmol/L 02/11/2021 14:05 CST    CO2 22 mmol/L 02/12/2021 04:30 CST    CO2 27 mmol/L 02/11/2021 14:05 CST    Calcium Lvl 8.9 mg/dL 02/12/2021 04:30 CST    Calcium Lvl 9.1 mg/dL 02/11/2021 14:05 CST    Glucose Lvl 92 mg/dL 02/12/2021 04:30 CST    Glucose Lvl 65 mg/dL (Low) 02/11/2021  14:05 CST    BUN 19.0 mg/dL 02/12/2021 04:30 CST    BUN 16.0 mg/dL 02/11/2021 14:05 CST    Creatinine 1.17 mg/dL 02/12/2021 04:30 CST    Creatinine 1.42 mg/dL (High) 02/11/2021 14:05 CST    Est Creat Clearance Ser 79.47 mL/min 02/12/2021 06:21 CST    Est Creat Clearance Ser 65.47 mL/min 02/11/2021 14:39 CST    eGFR-AA 87 (Low) 02/12/2021 04:30 CST    eGFR-AA 69 (Low) 02/11/2021 14:05 CST    eGFR-NEHEMIAH 72 mL/min/1.73 m2 (Low) 02/12/2021 04:30 CST    eGFR-NEHMEIAH 57 mL/min/1.73 m2 (Low) 02/11/2021 14:05 CST    Bili Total 0.5 mg/dL 02/12/2021 04:30 CST    Bili Total 0.7 mg/dL 02/11/2021 14:05 CST    Bili Direct 0.2 mg/dL 02/12/2021 04:30 CST    Bili Direct 0.3 mg/dL 02/11/2021 14:05 CST    Bili Indirect 0.30 mg/dL 02/12/2021 04:30 CST    Bili Indirect 0.40 mg/dL 02/11/2021 14:05 CST    AST 25 unit/L 02/12/2021 04:30 CST    AST 31 unit/L 02/11/2021 14:05 CST    ALT 26 unit/L 02/12/2021 04:30 CST    ALT 31 unit/L 02/11/2021 14:05 CST    Alk Phos 118 unit/L 02/12/2021 04:30 CST    Alk Phos 141 unit/L 02/11/2021 14:05 CST    Total Protein 6.5 gm/dL 02/12/2021 04:30 CST    Total Protein 7.5 gm/dL 02/11/2021 14:05 CST    Albumin Lvl 3.2 gm/dL (Low) 02/12/2021 04:30 CST    Albumin Lvl 3.8 gm/dL 02/11/2021 14:05 CST    Globulin 3.3 gm/dL 02/12/2021 04:30 CST    Globulin 3.7 gm/dL (High) 02/11/2021 14:05 CST    A/G Ratio 1.0 ratio (Low) 02/12/2021 04:30 CST    A/G Ratio 1.0 ratio (Low) 02/11/2021 14:05 CST    Total  U/L (High) 02/11/2021 17:03 CST    Total  U/L (High) 02/11/2021 14:05 CST    CK MB 4.2 ng/mL 02/11/2021 17:03 CST    CK MB 3.7 ng/mL 02/11/2021 14:05 CST    Troponin-I 0.192 ng/mL (High) 02/12/2021 04:30 CST 0.5 ng/mL is the accepted discriminant level for mycardial injury rather than a statistical normal limit for the general population.   Troponin-I 0.210 ng/mL (High) 02/11/2021 23:27 CST 0.5 ng/mL is the accepted discriminant level for mycardial injury rather than a statistical normal limit for the general  population.   Troponin-I 0.167 ng/mL (High) 02/11/2021 17:03 CST 0.5 ng/mL is the accepted discriminant level for mycardial injury rather than a statistical normal limit for the general population.   Troponin-I 0.130 ng/mL (High) 02/11/2021 14:05 CST 0.5 ng/mL is the accepted discriminant level for mycardial injury rather than a statistical normal limit for the general population.   T4 Free 0.93 ng/dL 02/11/2021 14:05 CST    TSH 0.2690 uIU/mL (Low) 02/11/2021 14:05 CST    U pH 6.5 02/11/2021 13:30 CST    WBC 3.2 x10(3)/mcL (Low) 02/12/2021 04:30 CST    WBC 5.8 x10(3)/mcL 02/11/2021 14:05 CST    RBC 4.75 x10(6)/mcL 02/12/2021 04:30 CST    RBC 4.99 x10(6)/mcL 02/11/2021 14:05 CST    Hgb 13.9 gm/dL 02/12/2021 04:30 CST    Hgb 14.5 gm/dL 02/11/2021 14:05 CST    Hct 42.6 % 02/12/2021 04:30 CST    Hct 44.9 % 02/11/2021 14:05 CST    Platelet 182 x10(3)/mcL 02/12/2021 04:30 CST    Platelet 190 x10(3)/mcL 02/11/2021 14:05 CST    MCV 89.7 fL 02/12/2021 04:30 CST    MCV 90.0 fL 02/11/2021 14:05 CST    MCH 29.3 pg 02/12/2021 04:30 CST    MCH 29.1 pg 02/11/2021 14:05 CST    MCHC 32.6 gm/dL 02/12/2021 04:30 CST    MCHC 32.3 gm/dL 02/11/2021 14:05 CST    RDW 13.8 % 02/12/2021 04:30 CST    RDW 14.1 % 02/11/2021 14:05 CST    MPV 11.9 fL (High) 02/12/2021 04:30 CST    MPV 11.1 fL (High) 02/11/2021 14:05 CST    Abs Neut 0.94 x10(3)/mcL (Low) 02/12/2021 04:30 CST    Abs Neut 4.24 x10(3)/mcL 02/11/2021 14:05 CST    Neutro Auto 73 % 02/11/2021 14:05 CST    Lymph Auto 16 % 02/11/2021 14:05 CST    Mono Auto 10 % 02/11/2021 14:05 CST    Eos Auto 0 % 02/11/2021 14:05 CST    Abs Eos 0.0 x10(3)/mcL 02/11/2021 14:05 CST    Basophil Auto 0 % 02/11/2021 14:05 CST    Abs Neutro 4.24 x10(3)/mcL 02/11/2021 14:05 CST    Abs Lymph 0.9 x10(3)/mcL 02/11/2021 14:05 CST    Abs Mono 0.6 x10(3)/mcL 02/11/2021 14:05 CST    Abs Baso 0.0 x10(3)/mcL 02/11/2021 14:05 CST    IG% 0 % 02/11/2021 14:05 CST    IG# 0.020 02/11/2021 14:05 CST    Segs Man 19 % (Low)  02/12/2021 04:30 CST    Lymph Man 60.0 % (High) 02/12/2021 04:30 CST    Monocyte Man 18 % (High) 02/12/2021 04:30 CST    Eos Man 2 % 02/12/2021 04:30 CST    Basophil Man 1 % 02/12/2021 04:30 CST    Platelet Est Adequate 02/12/2021 04:30 CST    Anisocyte 1+ 02/12/2021 04:30 CST    RBC Morph Abnormal 02/12/2021 04:30 CST    UA Appear Clear 02/11/2021 13:30 CST    UA Color YELLOW 02/11/2021 13:30 CST    UA Spec Grav 1.013 02/11/2021 13:30 CST    UA Bili Negative 02/11/2021 13:30 CST    UA pH 6.5 02/11/2021 13:30 CST    UA Urobilinogen Normal 02/11/2021 13:30 CST    UA Blood Negative 02/11/2021 13:30 CST    UA Glucose Negative 02/11/2021 13:30 CST    UA Ketones Negative 02/11/2021 13:30 CST    UA Protein 50 (Abnormal) 02/11/2021 13:30 CST    UA Nitrite Negative 02/11/2021 13:30 CST    UA Leuk Est Negative 02/11/2021 13:30 CST    UA WBC Interp 11-25 (Abnormal) 02/11/2021 13:30 CST    UA RBC Interp 0-2 02/11/2021 13:30 CST    UA Bact Interp None Seen 02/11/2021 13:30 CST    UA Squam Epi Interp  (Abnormal) 02/11/2021 13:30 CST    UA Sperm Moderate (Abnormal) 02/11/2021 13:30 CST    UA Hyal Cast Interp 6-10 (Abnormal) 02/11/2021 13:30 CST    Acetaminoph Lvl <17.4 ug/ml (Low) 02/11/2021 14:05 CST    Salicylate Lvl <5.0 mg/dL 02/11/2021 14:05 CST    Ethanol Lvl <10.0 mg/dL 02/11/2021 14:05 CST    U Amph Scr Negative 02/11/2021 13:30 CST    U Elizabeth Scr Negative 02/11/2021 13:30 CST    U Benzodia Scr Negative 02/11/2021 13:30 CST    U Cannab Scr Positive (Abnormal) 02/11/2021 13:30 CST    U Cocaine Scr Positive (Abnormal) 02/11/2021 13:30 CST    U Opiate Scr Negative 02/11/2021 13:30 CST    U Phencyclidine Scr Negative 02/11/2021 13:30 CST    HIV Reactive (Abnormal) 02/12/2021 04:30 CST    HIV Rapid Scrn Non-Reactive 02/11/2021 19:50 CST    Spec Source Oral Fluid 02/11/2021 19:50 CST    COVID-19 Rapid NEGATIVE 02/11/2021 13:39 CST    Problem List/Past Medical History  Ongoing  No qualifying data  Historical  No qualifying  data